# Patient Record
Sex: FEMALE | Race: BLACK OR AFRICAN AMERICAN | NOT HISPANIC OR LATINO | Employment: FULL TIME | URBAN - METROPOLITAN AREA
[De-identification: names, ages, dates, MRNs, and addresses within clinical notes are randomized per-mention and may not be internally consistent; named-entity substitution may affect disease eponyms.]

---

## 2022-06-17 PROBLEM — F90.2 ATTENTION DEFICIT HYPERACTIVITY DISORDER (ADHD), COMBINED TYPE: Status: ACTIVE | Noted: 2022-06-17

## 2022-06-19 PROBLEM — E11.9 CONTROLLED TYPE 2 DIABETES MELLITUS WITHOUT COMPLICATION, WITHOUT LONG-TERM CURRENT USE OF INSULIN: Status: ACTIVE | Noted: 2022-06-19

## 2022-06-19 PROBLEM — F41.8 MIXED ANXIETY DEPRESSIVE DISORDER: Status: ACTIVE | Noted: 2022-06-19

## 2022-08-19 PROBLEM — E66.1 CLASS 1 DRUG-INDUCED OBESITY WITH BODY MASS INDEX (BMI) OF 32.0 TO 32.9 IN ADULT: Status: ACTIVE | Noted: 2022-08-19

## 2023-03-01 DIAGNOSIS — E11.9 TYPE 2 DIABETES MELLITUS WITHOUT COMPLICATION: ICD-10-CM

## 2023-03-08 ENCOUNTER — PATIENT MESSAGE (OUTPATIENT)
Dept: ADMINISTRATIVE | Facility: HOSPITAL | Age: 38
End: 2023-03-08

## 2023-07-24 ENCOUNTER — NURSE TRIAGE (OUTPATIENT)
Dept: ADMINISTRATIVE | Facility: CLINIC | Age: 38
End: 2023-07-24

## 2023-07-25 ENCOUNTER — NURSE TRIAGE (OUTPATIENT)
Dept: ADMINISTRATIVE | Facility: CLINIC | Age: 38
End: 2023-07-25

## 2023-07-25 NOTE — TELEPHONE ENCOUNTER
Abd pain and lower back pain since this pm, +nausea, +diarrhea.   Care advice states to go to the ED now.  Patient verbally understands, all questions answered, advised to call back for any worsening symptoms or further needs.     Reason for Disposition   [1] SEVERE pain (e.g., excruciating) AND [2] present > 1 hour    Additional Information   Negative: Shock suspected (e.g., cold/pale/clammy skin, too weak to stand, low BP, rapid pulse)   Negative: Difficult to awaken or acting confused (e.g., disoriented, slurred speech)   Negative: Passed out (i.e., lost consciousness, collapsed and was not responding)   Negative: Sounds like a life-threatening emergency to the triager   Negative: Followed an abdomen (stomach) injury   Negative: [1] Abdominal pain AND [2] pregnant < 20 weeks   Negative: [1] Abdominal pain AND [2] pregnant 20 or more weeks   Negative: [1] Abdominal pain AND [2] postpartum (from 0 to 6 weeks after delivery)    Protocols used: Abdominal Pain - Female-A-

## 2023-07-26 NOTE — TELEPHONE ENCOUNTER
"Spoke with patient states she was seen today in the office.  She was diagnosed with gastroenteritis. Patient wants to know if she can return to work tomorrow.  She also states she has small children and wants to know if she should limit contact with them.  She states this was not discussed at her visit.  Patient is not having any new or worsening symptoms.     Tried calling the number listed for on call provider Dr. Abbott which is not a direct line.  It states the office is closed and there are no other options to be connected.  Dr. Mahan was added to the secure chat message.  Dr. Abbott states "looks like the patient was seen by her PCP at the [facility]. She needs to contact her PCP tomorrow morning."    Dr. Mahan states " I just saw patient's chart and it seems that the PCP thinks it could be related to gastroenteritis likely viral, but it may akso be related to the Mounjaro, as well. Her labs were negative for pancreatitis. Patient will need to contact her PCP. In the mean time she will need to increase her oral intake. Her stool studies are still pending."  He also states she can stay at home tomorrow and ask for an return to work letter to her PCP. Regarding contact precautions, if she is not actively vomiting there is no need to, but if patient is actively vomiting, she will need to use her utensils, plates and disinfect the areas where she might have been in contact, just as a preventive measure.  This information was reviewed with patient and she verbalized understanding. Advised patient to call back with new or worsening symptoms.   Reason for Disposition   Nursing judgment or information in reference    Protocols used: No Guideline Usezdyave-L-AE    "

## 2023-07-31 ENCOUNTER — PATIENT MESSAGE (OUTPATIENT)
Dept: RESEARCH | Facility: HOSPITAL | Age: 38
End: 2023-07-31

## 2023-09-07 PROBLEM — E66.1 CLASS 1 DRUG-INDUCED OBESITY WITH BODY MASS INDEX (BMI) OF 32.0 TO 32.9 IN ADULT: Status: RESOLVED | Noted: 2022-08-19 | Resolved: 2023-09-07

## 2023-10-10 ENCOUNTER — NURSE TRIAGE (OUTPATIENT)
Dept: ADMINISTRATIVE | Facility: CLINIC | Age: 38
End: 2023-10-10

## 2023-10-11 NOTE — TELEPHONE ENCOUNTER
Caller c/o r great toe pain and numbness 7/10. Caller states she feels tingling to area and toe is purplish in color.  Pt advised per protocol and verbalized understanding.   Reason for Disposition   Purple or black skin on toe  (Exception: Person remembers bruising the toe from an injury.)    Additional Information   Negative: Foot is cool or blue in comparison to other foot    Protocols used: Toe Pain-A-AH

## 2023-12-13 ENCOUNTER — PATIENT OUTREACH (OUTPATIENT)
Dept: ADMINISTRATIVE | Facility: HOSPITAL | Age: 38
End: 2023-12-13

## 2023-12-13 DIAGNOSIS — E11.9 CONTROLLED TYPE 2 DIABETES MELLITUS WITHOUT COMPLICATION, WITHOUT LONG-TERM CURRENT USE OF INSULIN: Primary | ICD-10-CM

## 2024-03-09 ENCOUNTER — NURSE TRIAGE (OUTPATIENT)
Dept: ADMINISTRATIVE | Facility: CLINIC | Age: 39
End: 2024-03-09

## 2024-03-10 NOTE — TELEPHONE ENCOUNTER
Pt reports COVID and pneumonia vaccine on Thursday. Pt reports that since then she has been weak, ringing ears, chills, generalized lethargy. Also started with diarrhea today. Pt reports nausea but no vomiting.     Dispo is to see physician within 24 hours. Unable to make appt within time frame. UC/ED offered as good source of care. OD VV offered and declined. Pt will go to local  at opening in the morning. Care advice given.   Reason for Disposition   Earache     Right worse than left and ringing in ears    Additional Information   Negative: SEVERE difficulty breathing (e.g., struggling for each breath, speaks in single words)   Negative: Sounds like a life-threatening emergency to the triager   Negative: Fever > 104 F (40 C)   Negative: Patient sounds very sick or weak to the triager   Negative: [1] Fever > 101 F (38.3 C) AND [2] age > 60 years   Negative: [1] Fever > 100.0 F (37.8 C) AND [2] bedridden (e.g., CVA, chronic illness, recovering from surgery)   Negative: [1] Fever > 100.0 F (37.8 C) AND [2] diabetes mellitus or weak immune system (e.g., HIV positive, cancer chemo, splenectomy, organ transplant, chronic steroids)   Negative: Fever present > 3 days (72 hours)   Negative: [1] Fever returns after gone for over 24 hours AND [2] symptoms worse or not improved   Negative: [1] Sinus pain (not just congestion) AND [2] fever    Protocols used: Common Cold-A-

## 2024-06-08 ENCOUNTER — ON-DEMAND VIRTUAL (OUTPATIENT)
Dept: URGENT CARE | Facility: CLINIC | Age: 39
End: 2024-06-08
Payer: COMMERCIAL

## 2024-06-08 DIAGNOSIS — E86.0 DEHYDRATION: ICD-10-CM

## 2024-06-08 DIAGNOSIS — K92.1 BLACK STOOLS: Primary | ICD-10-CM

## 2024-06-08 PROCEDURE — 99212 OFFICE O/P EST SF 10 MIN: CPT | Mod: 95,,, | Performed by: FAMILY MEDICINE

## 2024-06-08 NOTE — PROGRESS NOTES
Subjective:      Patient ID: Anita Barrera is a 38 y.o. female.    Vitals:  vitals were not taken for this visit.     Chief Complaint: Emesis      Visit Type: TELE AUDIOVISUAL    Present with the patient at the time of consultation: TELEMED PRESENT WITH PATIENT: None    Past Medical History:   Diagnosis Date    ADHD (attention deficit hyperactivity disorder), combined type     Anxiety and depression     Type 2 diabetes mellitus      Past Surgical History:   Procedure Laterality Date    BICEPS TENDON REPAIR Bilateral     CHOLECYSTECTOMY      DILATION AND CURETTAGE OF UTERUS      Right foot surgery      ROTATOR CUFF REPAIR Right      Review of patient's allergies indicates:   Allergen Reactions    Pine nut Hives, Rash and Swelling     Current Outpatient Medications on File Prior to Visit   Medication Sig Dispense Refill    albuterol (VENTOLIN HFA) 90 mcg/actuation inhaler Inhale 2 puffs into the lungs every 6 (six) hours as needed for Wheezing. Rescue 18 g 0    buPROPion (WELLBUTRIN XL) 300 MG 24 hr tablet Take 1 tablet (300 mg total) by mouth once daily. 90 tablet 3    clindamycin phosphate 1% (CLINDAGEL) 1 % gel Apply topically 2 (two) times daily. 30 g 1    EScitalopram oxalate (LEXAPRO) 10 MG tablet Take 1 tablet (10 mg total) by mouth once daily. 90 tablet 1    famotidine (PEPCID AC) 20 MG tablet Take 1 tablet (20 mg total) by mouth 2 (two) times daily. 60 tablet 11    lisdexamfetamine (VYVANSE) 50 MG capsule Take 1 capsule (50 mg total) by mouth every morning. 30 capsule 0    lisdexamfetamine (VYVANSE) 50 MG capsule Take 1 capsule (50 mg total) by mouth every morning. 30 capsule 0    ondansetron (ZOFRAN-ODT) 4 MG TbDL Take 1 tablet (4 mg total) by mouth every 8 (eight) hours as needed (nausea). 30 tablet 0    pantoprazole (PROTONIX) 40 MG tablet Take 1 tablet (40 mg total) by mouth once daily. 90 tablet 3    tirzepatide (MOUNJARO) 5 mg/0.5 mL PnIj Inject 5 mg into the skin every 7 days. 12 pen 3     No  current facility-administered medications on file prior to visit.     Family History   Problem Relation Name Age of Onset    Heart disease Mother      Hypertension Mother      Mental illness Mother      Pancreatic cancer Father      Diabetes Father      Diabetes Sister      Fibromyalgia Sister      Diabetes Brother      Obesity Brother      Narcolepsy Brother      Stroke Maternal Grandmother      Dementia Maternal Grandmother      Diabetes Maternal Grandmother      Leukemia Maternal Grandfather      Diabetes Paternal Grandmother      Early death Paternal Grandfather             [facility] P[facility] Odvv Intake    6/8/2024  2:44 PM CDT - Filed by Patient   What is your current physical address in the event of a medical emergency? [address] [place] [id number]   Are you able to take your vital signs? Yes   Systolic Blood Pressure: 95   Diastolic Blood Pressure: 62   Weight: 149   Height: 63   Pulse: 88   Temperature: 97.3   Respiration rate:    Pulse Oxygen:    Please attach any relevant images or files          Patient Location: home    Emesis   This is a new problem. Episode onset: 3 days ago. There has been no fever. Associated symptoms include abdominal pain and diarrhea. Pertinent negatives include no arthralgias, chest pain, chills, coughing, decreased urine volume, dizziness, fever, headaches, myalgias, sweats, URI or weight loss. Associated symptoms comments: Diarrhea is black today.   Unable to eat anything.  Having dizziness, tinnitus and feels very weak. . She has tried nothing for the symptoms.       Constitution: Negative for chills and fever.   Cardiovascular:  Negative for chest pain.   Respiratory:  Negative for cough.    Gastrointestinal:  Positive for abdominal pain, vomiting and diarrhea.   Genitourinary:  Negative for urine decreased.   Musculoskeletal:  Negative for joint pain and muscle ache.   Neurological:  Negative for dizziness and headaches.        Objective:   The physical exam was conducted  virtually.  Physical Exam   Constitutional: She is oriented to person, place, and time.   HENT:   Head: Normocephalic.   Ears:   Right Ear: External ear normal.   Left Ear: External ear normal.   Eyes: Conjunctivae are normal.   Pulmonary/Chest: Effort normal. No respiratory distress.   Abdominal: Normal appearance.   Neurological: She is alert and oriented to person, place, and time.   Psychiatric: Her behavior is normal.       Assessment:     1. Black stools    2. Dehydration        Plan:       Black stools    Dehydration       - I'm concerned that you could have a GI bleed.   - I recommend you go to the ER to be evauated.  Patient agrees with this plan.

## 2024-08-08 LAB — CRC RECOMMENDATION EXT: NORMAL

## 2024-08-26 ENCOUNTER — PATIENT OUTREACH (OUTPATIENT)
Dept: ADMINISTRATIVE | Facility: HOSPITAL | Age: 39
End: 2024-08-26
Payer: COMMERCIAL

## 2024-08-26 DIAGNOSIS — Z12.31 BREAST CANCER SCREENING BY MAMMOGRAM: Primary | ICD-10-CM

## 2024-09-05 ENCOUNTER — PATIENT OUTREACH (OUTPATIENT)
Dept: ADMINISTRATIVE | Facility: HOSPITAL | Age: 39
End: 2024-09-05
Payer: COMMERCIAL